# Patient Record
Sex: MALE | Race: WHITE | NOT HISPANIC OR LATINO | URBAN - METROPOLITAN AREA
[De-identification: names, ages, dates, MRNs, and addresses within clinical notes are randomized per-mention and may not be internally consistent; named-entity substitution may affect disease eponyms.]

---

## 2023-11-22 ENCOUNTER — EMERGENCY (EMERGENCY)
Facility: HOSPITAL | Age: 28
LOS: 0 days | Discharge: HOME | End: 2023-11-22
Attending: STUDENT IN AN ORGANIZED HEALTH CARE EDUCATION/TRAINING PROGRAM
Payer: COMMERCIAL

## 2023-11-22 VITALS
OXYGEN SATURATION: 100 % | HEART RATE: 92 BPM | DIASTOLIC BLOOD PRESSURE: 65 MMHG | RESPIRATION RATE: 18 BRPM | TEMPERATURE: 98 F | SYSTOLIC BLOOD PRESSURE: 131 MMHG

## 2023-11-22 VITALS
DIASTOLIC BLOOD PRESSURE: 64 MMHG | WEIGHT: 125 LBS | HEART RATE: 99 BPM | SYSTOLIC BLOOD PRESSURE: 135 MMHG | RESPIRATION RATE: 18 BRPM | TEMPERATURE: 98 F | HEIGHT: 67 IN | OXYGEN SATURATION: 100 %

## 2023-11-22 DIAGNOSIS — I86.1 SCROTAL VARICES: ICD-10-CM

## 2023-11-22 DIAGNOSIS — N50.811 RIGHT TESTICULAR PAIN: ICD-10-CM

## 2023-11-22 LAB
ANION GAP SERPL CALC-SCNC: 10 MMOL/L — SIGNIFICANT CHANGE UP (ref 7–14)
ANION GAP SERPL CALC-SCNC: 10 MMOL/L — SIGNIFICANT CHANGE UP (ref 7–14)
APPEARANCE UR: CLEAR — SIGNIFICANT CHANGE UP
APPEARANCE UR: CLEAR — SIGNIFICANT CHANGE UP
BILIRUB UR-MCNC: NEGATIVE — SIGNIFICANT CHANGE UP
BILIRUB UR-MCNC: NEGATIVE — SIGNIFICANT CHANGE UP
BUN SERPL-MCNC: 13 MG/DL — SIGNIFICANT CHANGE UP (ref 10–20)
BUN SERPL-MCNC: 13 MG/DL — SIGNIFICANT CHANGE UP (ref 10–20)
CALCIUM SERPL-MCNC: 10.1 MG/DL — SIGNIFICANT CHANGE UP (ref 8.4–10.5)
CALCIUM SERPL-MCNC: 10.1 MG/DL — SIGNIFICANT CHANGE UP (ref 8.4–10.5)
CHLORIDE SERPL-SCNC: 102 MMOL/L — SIGNIFICANT CHANGE UP (ref 98–110)
CHLORIDE SERPL-SCNC: 102 MMOL/L — SIGNIFICANT CHANGE UP (ref 98–110)
CO2 SERPL-SCNC: 28 MMOL/L — SIGNIFICANT CHANGE UP (ref 17–32)
CO2 SERPL-SCNC: 28 MMOL/L — SIGNIFICANT CHANGE UP (ref 17–32)
COLOR SPEC: YELLOW — SIGNIFICANT CHANGE UP
COLOR SPEC: YELLOW — SIGNIFICANT CHANGE UP
CREAT SERPL-MCNC: 0.8 MG/DL — SIGNIFICANT CHANGE UP (ref 0.7–1.5)
CREAT SERPL-MCNC: 0.8 MG/DL — SIGNIFICANT CHANGE UP (ref 0.7–1.5)
DIFF PNL FLD: NEGATIVE — SIGNIFICANT CHANGE UP
DIFF PNL FLD: NEGATIVE — SIGNIFICANT CHANGE UP
EGFR: 122 ML/MIN/1.73M2 — SIGNIFICANT CHANGE UP
EGFR: 122 ML/MIN/1.73M2 — SIGNIFICANT CHANGE UP
GLUCOSE SERPL-MCNC: 94 MG/DL — SIGNIFICANT CHANGE UP (ref 70–99)
GLUCOSE SERPL-MCNC: 94 MG/DL — SIGNIFICANT CHANGE UP (ref 70–99)
GLUCOSE UR QL: NEGATIVE MG/DL — SIGNIFICANT CHANGE UP
GLUCOSE UR QL: NEGATIVE MG/DL — SIGNIFICANT CHANGE UP
HCT VFR BLD CALC: 48 % — SIGNIFICANT CHANGE UP (ref 42–52)
HCT VFR BLD CALC: 48 % — SIGNIFICANT CHANGE UP (ref 42–52)
HGB BLD-MCNC: 17 G/DL — SIGNIFICANT CHANGE UP (ref 14–18)
HGB BLD-MCNC: 17 G/DL — SIGNIFICANT CHANGE UP (ref 14–18)
KETONES UR-MCNC: ABNORMAL MG/DL
KETONES UR-MCNC: ABNORMAL MG/DL
LEUKOCYTE ESTERASE UR-ACNC: NEGATIVE — SIGNIFICANT CHANGE UP
LEUKOCYTE ESTERASE UR-ACNC: NEGATIVE — SIGNIFICANT CHANGE UP
MCHC RBC-ENTMCNC: 31.3 PG — HIGH (ref 27–31)
MCHC RBC-ENTMCNC: 31.3 PG — HIGH (ref 27–31)
MCHC RBC-ENTMCNC: 35.4 G/DL — SIGNIFICANT CHANGE UP (ref 32–37)
MCHC RBC-ENTMCNC: 35.4 G/DL — SIGNIFICANT CHANGE UP (ref 32–37)
MCV RBC AUTO: 88.4 FL — SIGNIFICANT CHANGE UP (ref 80–94)
MCV RBC AUTO: 88.4 FL — SIGNIFICANT CHANGE UP (ref 80–94)
NITRITE UR-MCNC: NEGATIVE — SIGNIFICANT CHANGE UP
NITRITE UR-MCNC: NEGATIVE — SIGNIFICANT CHANGE UP
NRBC # BLD: 0 /100 WBCS — SIGNIFICANT CHANGE UP (ref 0–0)
NRBC # BLD: 0 /100 WBCS — SIGNIFICANT CHANGE UP (ref 0–0)
PH UR: 7.5 — SIGNIFICANT CHANGE UP (ref 5–8)
PH UR: 7.5 — SIGNIFICANT CHANGE UP (ref 5–8)
PLATELET # BLD AUTO: 239 K/UL — SIGNIFICANT CHANGE UP (ref 130–400)
PLATELET # BLD AUTO: 239 K/UL — SIGNIFICANT CHANGE UP (ref 130–400)
PMV BLD: 10.7 FL — HIGH (ref 7.4–10.4)
PMV BLD: 10.7 FL — HIGH (ref 7.4–10.4)
POTASSIUM SERPL-MCNC: 4.8 MMOL/L — SIGNIFICANT CHANGE UP (ref 3.5–5)
POTASSIUM SERPL-MCNC: 4.8 MMOL/L — SIGNIFICANT CHANGE UP (ref 3.5–5)
POTASSIUM SERPL-SCNC: 4.8 MMOL/L — SIGNIFICANT CHANGE UP (ref 3.5–5)
POTASSIUM SERPL-SCNC: 4.8 MMOL/L — SIGNIFICANT CHANGE UP (ref 3.5–5)
PROT UR-MCNC: NEGATIVE MG/DL — SIGNIFICANT CHANGE UP
PROT UR-MCNC: NEGATIVE MG/DL — SIGNIFICANT CHANGE UP
RBC # BLD: 5.43 M/UL — SIGNIFICANT CHANGE UP (ref 4.7–6.1)
RBC # BLD: 5.43 M/UL — SIGNIFICANT CHANGE UP (ref 4.7–6.1)
RBC # FLD: 12 % — SIGNIFICANT CHANGE UP (ref 11.5–14.5)
RBC # FLD: 12 % — SIGNIFICANT CHANGE UP (ref 11.5–14.5)
SODIUM SERPL-SCNC: 140 MMOL/L — SIGNIFICANT CHANGE UP (ref 135–146)
SODIUM SERPL-SCNC: 140 MMOL/L — SIGNIFICANT CHANGE UP (ref 135–146)
SP GR SPEC: 1.01 — SIGNIFICANT CHANGE UP (ref 1–1.03)
SP GR SPEC: 1.01 — SIGNIFICANT CHANGE UP (ref 1–1.03)
UROBILINOGEN FLD QL: 0.2 MG/DL — SIGNIFICANT CHANGE UP (ref 0.2–1)
UROBILINOGEN FLD QL: 0.2 MG/DL — SIGNIFICANT CHANGE UP (ref 0.2–1)
WBC # BLD: 5.92 K/UL — SIGNIFICANT CHANGE UP (ref 4.8–10.8)
WBC # BLD: 5.92 K/UL — SIGNIFICANT CHANGE UP (ref 4.8–10.8)
WBC # FLD AUTO: 5.92 K/UL — SIGNIFICANT CHANGE UP (ref 4.8–10.8)
WBC # FLD AUTO: 5.92 K/UL — SIGNIFICANT CHANGE UP (ref 4.8–10.8)

## 2023-11-22 PROCEDURE — 99284 EMERGENCY DEPT VISIT MOD MDM: CPT

## 2023-11-22 PROCEDURE — 76870 US EXAM SCROTUM: CPT

## 2023-11-22 PROCEDURE — 85027 COMPLETE CBC AUTOMATED: CPT

## 2023-11-22 PROCEDURE — 36415 COLL VENOUS BLD VENIPUNCTURE: CPT

## 2023-11-22 PROCEDURE — 76870 US EXAM SCROTUM: CPT | Mod: 26

## 2023-11-22 PROCEDURE — 99284 EMERGENCY DEPT VISIT MOD MDM: CPT | Mod: 25

## 2023-11-22 PROCEDURE — 76770 US EXAM ABDO BACK WALL COMP: CPT

## 2023-11-22 PROCEDURE — 76770 US EXAM ABDO BACK WALL COMP: CPT | Mod: 26

## 2023-11-22 PROCEDURE — 81003 URINALYSIS AUTO W/O SCOPE: CPT

## 2023-11-22 PROCEDURE — 80048 BASIC METABOLIC PNL TOTAL CA: CPT

## 2023-11-22 NOTE — ED PROVIDER NOTE - NSFOLLOWUPINSTRUCTIONS_ED_ALL_ED_FT
Varicocele    A varicocele is a swelling of veins in the scrotum. The scrotum is the sac that contains the testicles. Varicoceles can occur on either side of the scrotum, but they are more common on the left side. They occur most often in teenage boys and young men.    In most cases, varicoceles are not a serious problem. They are usually small and painless and do not require treatment. Tests may be done to confirm the diagnosis. Treatment may be needed if:  A varicocele is large, causes a lot of pain, or causes pain when exercising.  Varicoceles are found on both sides of the scrotum.  A varicocele causes a decrease in the size of the testicle in a growing adolescent.  The person has fertility problems.  What are the causes?  This condition is the result of valves in the veins not working properly. Valves in the veins help to return blood from the scrotum and testicles to the heart. If these valves do not work well, blood flows backward and backs up into the veins, which causes the veins to swell. This is similar to what happens when varicose veins form in the leg.    What are the signs or symptoms?  Most varicoceles do not cause any symptoms. If symptoms do occur, they may include:  Swelling on one side of the scrotum. The swelling may be more obvious when you are standing up.  A lumpy feeling in the scrotum.  A heavy feeling on one side of the scrotum.  A dull ache in the scrotum, especially after exercise or prolonged standing or sitting.  Slower growth or reduced size of the testicle on the side of the varicocele (in young males).  Problems with fathering a child (fertility). This can occur if the testicle does not grow normally or if the condition causes problems with the sperm, such as a low sperm count or sperm that are not able to reach the egg (poor motility).  How is this diagnosed?  This condition is diagnosed based on:  Your medical history.  A physical exam. Your health care provider may inspect and feel (palpate) the scrotal area to check for swollen or enlarged veins.  An ultrasound. This may be done to confirm the diagnosis and to help rule out other causes of the swelling.  How is this treated?  Treatment is usually not needed for this condition. If you have any pain, your health care provider may prescribe or recommend medicine to help relieve it. You may need regular exams so your health care provider can monitor the varicocele to ensure that it does not cause problems.    When further treatment is needed, it may involve one of these options:  Varicocelectomy. This is a surgery in which the swollen veins are tied off so that the flow of blood goes to other veins instead.  Embolization. In this procedure, a small, thin tube (catheter) is used to place metal coils or other blocking items in the veins. This cuts off the blood flow to the swollen veins.  Follow these instructions at home:  Take over-the-counter and prescription medicines only as told by your health care provider.  Wear supportive underwear.  Use an athletic supporter when participating in sports activities.  Keep all follow-up visits as told by your health care provider. This is important.  Contact a health care provider if:  Your pain is increasing.  You have redness in the affected area.  Your testicle becomes enlarged, swollen, or painful.  You have swelling that does not decrease when you are lying down.  One of your testicles is smaller than the other.  Get help right away if:  You develop swelling in your legs.  You have difficulty breathing.  Summary  Varicocele is a condition in which the veins in the scrotum are swollen or enlarged.  In most cases, varicoceles do not require treatment.  Treatment may be needed if you have pain, have problems with infertility, or have a smaller testicle associated with the varicocele.  In some cases, the condition may be treated with a procedure to cut off the flow of blood to the swollen veins.  This information is not intended to replace advice given to you by your health care provider. Make sure you discuss any questions you have with your health care provider.

## 2023-11-22 NOTE — ED PROVIDER NOTE - CLINICAL SUMMARY MEDICAL DECISION MAKING FREE TEXT BOX
29 yo M patient with no PMH comes in for right testis heaviness and slight tenderness upon examination of the upper pole of the right testis.    US of the testicles showed bilateral varicoceles, no hydro on US of kidney and bladder, UA negative for hematuria.    Patient will be instructed to follow up with urologist as outpatient. 27 yo M patient with no PMH comes in for right testis heaviness and slight tenderness upon examination of the upper pole of the right testis.  Well appearing, NAD, non toxic. NCAT PERRLA EOMI neck supple non tender normal wob cta bl rrr abdomen s nt nd no rebound no guarding WWPx4 neuro non focal,  exam shows normal cremasterics reflex mild tenderness, no CVA tenderness in the right testes chaperoned by Dr. Morales    US of the testicles showed bilateral varicoceles, no hydro on US of kidney and bladder, UA negative for hematuria.  Creatinine normal    Labs and EKG were ordered and reviewed by me.  Imaging was ordered and reviewed by me.  Appropriate medications for patient's presenting complaints were ordered and effects were reassessed.   Patient's records prior hospital visit, external, ED visit, and/or nursing home notes, prior EKG, prior imaging were reviewed.  Additional history was obtained from self. Escalation to admission/observation was considered.  Chronic conditions affecting care -none. However patient feels much better and is comfortable with discharge.  Appropriate follow-up was arranged.

## 2023-11-22 NOTE — ED PROVIDER NOTE - PATIENT PORTAL LINK FT
You can access the FollowMyHealth Patient Portal offered by [facility] by registering at the following website: [email or web address]. By joining [facility] FollowMyHealth portal, you will also be able to view your health information using other applications (apps) compatible with our system.

## 2023-11-22 NOTE — ED ADULT NURSE NOTE - NSFALLUNIVINTERV_ED_ALL_ED
Bed/Stretcher in lowest position, wheels locked, appropriate side rails in place/Call bell, personal items and telephone in reach/Instruct patient to call for assistance before getting out of bed/chair/stretcher/Non-slip footwear applied when patient is off stretcher/[place] to call system/Physically safe environment - no spills, clutter or unnecessary equipment/Purposeful proactive rounding/Room/bathroom lighting operational, light cord in reach

## 2023-11-22 NOTE — ED PROVIDER NOTE - ADDITIONAL NOTES AND INSTRUCTIONS:
Patient was seen on 11/22 in the ED for heaviness in the private area for which he was investigated.

## 2023-11-22 NOTE — ED PROVIDER NOTE - OBJECTIVE STATEMENT
29 yo M patient with no PMH comes in for right testes pain.    Patient reports pain has been going for a couple of weeks, paroxysmal, of the right testis, sometimes accompanied by right sided flank pain migrating to the area.  No history of kidney stones, no ureteral discharge, no evidence of STDs, no recent sexual practice, no urinary incontinence/ retention.    Upon examination, the patient has tenderness over the spermatic cord and upper pole of the right testis. 27 yo M patient with no PMH comes in for right testes pain.    Patient reports pain has been going for a couple of weeks, paroxysmal, of the right testis, sometimes accompanied by right sided flank pain migrating to the area.  No history of kidney stones, no ureteral discharge, no evidence of STDs, no recent sexual practice, no urinary incontinence/ retention.    Upon examination, the patient has tenderness over the spermatic cord and upper pole of the right testis.    Ordered UA, CBC, BMP and imaging studies